# Patient Record
Sex: MALE | Race: WHITE | NOT HISPANIC OR LATINO
[De-identification: names, ages, dates, MRNs, and addresses within clinical notes are randomized per-mention and may not be internally consistent; named-entity substitution may affect disease eponyms.]

---

## 2019-07-01 ENCOUNTER — APPOINTMENT (OUTPATIENT)
Dept: INTERNAL MEDICINE | Facility: CLINIC | Age: 54
End: 2019-07-01
Payer: COMMERCIAL

## 2019-07-01 ENCOUNTER — NON-APPOINTMENT (OUTPATIENT)
Age: 54
End: 2019-07-01

## 2019-07-01 VITALS
HEIGHT: 67 IN | RESPIRATION RATE: 16 BRPM | HEART RATE: 62 BPM | DIASTOLIC BLOOD PRESSURE: 90 MMHG | TEMPERATURE: 97.9 F | WEIGHT: 248 LBS | OXYGEN SATURATION: 96 % | SYSTOLIC BLOOD PRESSURE: 140 MMHG | BODY MASS INDEX: 38.92 KG/M2

## 2019-07-01 DIAGNOSIS — Z80.0 FAMILY HISTORY OF MALIGNANT NEOPLASM OF DIGESTIVE ORGANS: ICD-10-CM

## 2019-07-01 DIAGNOSIS — I10 ESSENTIAL (PRIMARY) HYPERTENSION: ICD-10-CM

## 2019-07-01 DIAGNOSIS — Z86.39 PERSONAL HISTORY OF OTHER ENDOCRINE, NUTRITIONAL AND METABOLIC DISEASE: ICD-10-CM

## 2019-07-01 DIAGNOSIS — E66.9 OBESITY, UNSPECIFIED: ICD-10-CM

## 2019-07-01 DIAGNOSIS — Z00.00 ENCOUNTER FOR GENERAL ADULT MEDICAL EXAMINATION W/OUT ABNORMAL FINDINGS: ICD-10-CM

## 2019-07-01 DIAGNOSIS — J45.909 UNSPECIFIED ASTHMA, UNCOMPLICATED: ICD-10-CM

## 2019-07-01 DIAGNOSIS — N50.819 TESTICULAR PAIN, UNSPECIFIED: ICD-10-CM

## 2019-07-01 PROCEDURE — 93000 ELECTROCARDIOGRAM COMPLETE: CPT

## 2019-07-01 PROCEDURE — 99386 PREV VISIT NEW AGE 40-64: CPT | Mod: 25

## 2019-07-01 RX ORDER — MONTELUKAST 10 MG/1
10 TABLET, FILM COATED ORAL DAILY
Qty: 30 | Refills: 5 | Status: ACTIVE | COMMUNITY

## 2019-07-01 NOTE — HEALTH RISK ASSESSMENT
[Good] : ~his/her~  mood as  good [No] : In the past 12 months have you used drugs other than those required for medical reasons? No [No falls in past year] : Patient reported no falls in the past year [0] : 2) Feeling down, depressed, or hopeless: Not at all (0) [HIV Test offered] : HIV Test offered [None] : None [Feels Safe at Home] : Feels safe at home [Fully functional (bathing, dressing, toileting, transferring, walking, feeding)] : Fully functional (bathing, dressing, toileting, transferring, walking, feeding) [Fully functional (using the telephone, shopping, preparing meals, housekeeping, doing laundry, using] : Fully functional and needs no help or supervision to perform IADLs (using the telephone, shopping, preparing meals, housekeeping, doing laundry, using transportation, managing medications and managing finances) [Smoke Detector] : smoke detector [Carbon Monoxide Detector] : carbon monoxide detector [Seat Belt] :  uses seat belt [Sunscreen] : uses sunscreen [With Patient/Caregiver] : With Patient/Caregiver [FreeTextEntry1] : Check up\par  [] : No [de-identified] : None [[id number]] : 0 [Change in mental status noted] : No change in mental status noted [Reports changes in hearing] : Reports no changes in hearing [Reports changes in vision] : Reports no changes in vision [Reports changes in dental health] : Reports no changes in dental health [ColonoscopyComments] : As ordered for today. [AdvancecareDate] : 07/19

## 2019-07-01 NOTE — COUNSELING
[Weight management counseling provided] : Weight management [Healthy eating counseling provided] : healthy eating [Activity counseling provided] : activity [Good understanding] : Patient has a good understanding of disease, goals and obesity follow-up plan [None] : None

## 2019-07-01 NOTE — ASSESSMENT
[FreeTextEntry1] : 53 year old male found to have stable Hypertension, Asthma, Obesity,with the current regimen, diet and life style modifications, as counseled. Prior results reviewed and discussed with the patient during today's examination. Plan as ordered.\par EKG showed NSR at the rate of 69 BPM, LAD, non-sp ST-T changes noted.\par Patient is refusing all immunizations, in spite of counseling risks and benefits.\par

## 2019-07-01 NOTE — REVIEW OF SYSTEMS
[Negative] : Heme/Lymph [FreeTextEntry8] : Long standing intermittent Testicular pain, URO F/U as counseled.

## 2019-07-01 NOTE — HISTORY OF PRESENT ILLNESS
[de-identified] : 53 year old male patient with history of stable Hypertension, Asthma, Obesity, history as stated, presented for an annual preventative examination. Patient denies any associated symptoms of shortness of breath, chest pain, abdominal pain at this time.\par \par

## 2019-07-02 DIAGNOSIS — R73.09 OTHER ABNORMAL GLUCOSE: ICD-10-CM

## 2019-07-02 DIAGNOSIS — E78.00 PURE HYPERCHOLESTEROLEMIA, UNSPECIFIED: ICD-10-CM

## 2019-07-02 DIAGNOSIS — E55.9 VITAMIN D DEFICIENCY, UNSPECIFIED: ICD-10-CM

## 2019-07-02 LAB
25(OH)D3 SERPL-MCNC: 26.1 NG/ML
ALBUMIN SERPL ELPH-MCNC: 4.3 G/DL
ALP BLD-CCNC: 55 U/L
ALT SERPL-CCNC: 22 U/L
ANION GAP SERPL CALC-SCNC: 13 MMOL/L
APPEARANCE: CLEAR
AST SERPL-CCNC: 18 U/L
BASOPHILS # BLD AUTO: 0.06 K/UL
BASOPHILS NFR BLD AUTO: 1.5 %
BILIRUB SERPL-MCNC: 0.2 MG/DL
BILIRUBIN URINE: NEGATIVE
BLOOD URINE: NEGATIVE
BUN SERPL-MCNC: 14 MG/DL
CALCIUM SERPL-MCNC: 9.2 MG/DL
CHLORIDE SERPL-SCNC: 106 MMOL/L
CHOLEST SERPL-MCNC: 234 MG/DL
CHOLEST/HDLC SERPL: 5.2 RATIO
CO2 SERPL-SCNC: 24 MMOL/L
COLOR: YELLOW
CREAT SERPL-MCNC: 0.87 MG/DL
EOSINOPHIL # BLD AUTO: 0.15 K/UL
EOSINOPHIL NFR BLD AUTO: 3.8 %
ERYTHROCYTE [SEDIMENTATION RATE] IN BLOOD BY WESTERGREN METHOD: 14 MM/HR
ESTIMATED AVERAGE GLUCOSE: 120 MG/DL
FOLATE SERPL-MCNC: 13.2 NG/ML
GGT SERPL-CCNC: 30 U/L
GLUCOSE QUALITATIVE U: NEGATIVE
GLUCOSE SERPL-MCNC: 106 MG/DL
HBA1C MFR BLD HPLC: 5.8 %
HCT VFR BLD CALC: 43 %
HDLC SERPL-MCNC: 45 MG/DL
HGB BLD-MCNC: 14.2 G/DL
HIV1+2 AB SPEC QL IA.RAPID: NONREACTIVE
HSV 1+2 IGG SER IA-IMP: NEGATIVE
HSV 1+2 IGG SER IA-IMP: NEGATIVE
HSV1 IGG SER QL: 0.84 INDEX
HSV2 IGG SER QL: 0.09 INDEX
IMM GRANULOCYTES NFR BLD AUTO: 0.5 %
IRON SATN MFR SERPL: 30 %
IRON SERPL-MCNC: 96 UG/DL
KETONES URINE: NEGATIVE
LDLC SERPL CALC-MCNC: 164 MG/DL
LEUKOCYTE ESTERASE URINE: NEGATIVE
LYMPHOCYTES # BLD AUTO: 1.38 K/UL
LYMPHOCYTES NFR BLD AUTO: 34.8 %
MAN DIFF?: NORMAL
MCHC RBC-ENTMCNC: 29.3 PG
MCHC RBC-ENTMCNC: 33 GM/DL
MCV RBC AUTO: 88.8 FL
MONOCYTES # BLD AUTO: 0.41 K/UL
MONOCYTES NFR BLD AUTO: 10.4 %
NEUTROPHILS # BLD AUTO: 1.94 K/UL
NEUTROPHILS NFR BLD AUTO: 49 %
NITRITE URINE: NEGATIVE
PH URINE: 6
PLATELET # BLD AUTO: 219 K/UL
POTASSIUM SERPL-SCNC: 4.5 MMOL/L
PROT SERPL-MCNC: 6.7 G/DL
PROTEIN URINE: NORMAL
PSA FREE FLD-MCNC: 28 %
PSA FREE SERPL-MCNC: 0.25 NG/ML
PSA SERPL-MCNC: 0.9 NG/ML
RBC # BLD: 4.84 M/UL
RBC # FLD: 13.9 %
SODIUM SERPL-SCNC: 143 MMOL/L
SPECIFIC GRAVITY URINE: 1.03
T PALLIDUM AB SER QL IA: NEGATIVE
T3 SERPL-MCNC: 88 NG/DL
T4 FREE SERPL-MCNC: 0.9 NG/DL
TIBC SERPL-MCNC: 319 UG/DL
TRIGL SERPL-MCNC: 126 MG/DL
TSH SERPL-ACNC: 0.67 UIU/ML
UIBC SERPL-MCNC: 223 UG/DL
UROBILINOGEN URINE: NORMAL
VIT B12 SERPL-MCNC: 496 PG/ML
WBC # FLD AUTO: 3.96 K/UL

## 2019-07-03 LAB
HSV1 IGM SER QL: NORMAL TITER
HSV2 AB FLD-ACNC: NORMAL TITER

## 2023-04-28 ENCOUNTER — HOSPITAL ENCOUNTER (EMERGENCY)
Facility: HOSPITAL | Age: 58
Discharge: HOME | End: 2023-04-28
Attending: EMERGENCY MEDICINE | Admitting: EMERGENCY MEDICINE

## 2023-04-28 ENCOUNTER — APPOINTMENT (EMERGENCY)
Dept: CT IMAGING | Facility: HOSPITAL | Age: 58
End: 2023-04-28

## 2023-04-28 VITALS
SYSTOLIC BLOOD PRESSURE: 126 MMHG | TEMPERATURE: 98 F | HEART RATE: 58 BPM | RESPIRATION RATE: 20 BRPM | DIASTOLIC BLOOD PRESSURE: 83 MMHG | OXYGEN SATURATION: 96 %

## 2023-04-28 DIAGNOSIS — R10.11 RIGHT UPPER QUADRANT ABDOMINAL PAIN: Primary | ICD-10-CM

## 2023-04-28 DIAGNOSIS — K76.9 LIVER LESION: ICD-10-CM

## 2023-04-28 DIAGNOSIS — R14.0 ABDOMINAL BLOATING: ICD-10-CM

## 2023-04-28 DIAGNOSIS — E27.8 ADRENAL NODULE (HCC): ICD-10-CM

## 2023-04-28 DIAGNOSIS — R59.9 LYMPH NODE ENLARGEMENT: ICD-10-CM

## 2023-04-28 LAB
ALBUMIN SERPL BCP-MCNC: 4.7 G/DL (ref 3.5–5)
ALP SERPL-CCNC: 60 U/L (ref 34–104)
ALT SERPL W P-5'-P-CCNC: 20 U/L (ref 7–52)
ANION GAP SERPL CALCULATED.3IONS-SCNC: 5 MMOL/L (ref 4–13)
AST SERPL W P-5'-P-CCNC: 19 U/L (ref 13–39)
BASOPHILS # BLD AUTO: 0.05 THOUSANDS/ΜL (ref 0–0.1)
BASOPHILS NFR BLD AUTO: 1 % (ref 0–1)
BILIRUB SERPL-MCNC: 0.36 MG/DL (ref 0.2–1)
BUN SERPL-MCNC: 13 MG/DL (ref 5–25)
CALCIUM SERPL-MCNC: 9.8 MG/DL (ref 8.4–10.2)
CHLORIDE SERPL-SCNC: 103 MMOL/L (ref 96–108)
CO2 SERPL-SCNC: 30 MMOL/L (ref 21–32)
CREAT SERPL-MCNC: 0.81 MG/DL (ref 0.6–1.3)
EOSINOPHIL # BLD AUTO: 0.21 THOUSAND/ΜL (ref 0–0.61)
EOSINOPHIL NFR BLD AUTO: 4 % (ref 0–6)
ERYTHROCYTE [DISTWIDTH] IN BLOOD BY AUTOMATED COUNT: 12.8 % (ref 11.6–15.1)
GFR SERPL CREATININE-BSD FRML MDRD: 100 ML/MIN/1.73SQ M
GLUCOSE SERPL-MCNC: 98 MG/DL (ref 65–140)
HCT VFR BLD AUTO: 47.3 % (ref 36.5–49.3)
HGB BLD-MCNC: 15.7 G/DL (ref 12–17)
IMM GRANULOCYTES # BLD AUTO: 0.02 THOUSAND/UL (ref 0–0.2)
IMM GRANULOCYTES NFR BLD AUTO: 0 % (ref 0–2)
LIPASE SERPL-CCNC: 28 U/L (ref 11–82)
LYMPHOCYTES # BLD AUTO: 1.91 THOUSANDS/ΜL (ref 0.6–4.47)
LYMPHOCYTES NFR BLD AUTO: 37 % (ref 14–44)
MCH RBC QN AUTO: 29.2 PG (ref 26.8–34.3)
MCHC RBC AUTO-ENTMCNC: 33.2 G/DL (ref 31.4–37.4)
MCV RBC AUTO: 88 FL (ref 82–98)
MONOCYTES # BLD AUTO: 0.5 THOUSAND/ΜL (ref 0.17–1.22)
MONOCYTES NFR BLD AUTO: 10 % (ref 4–12)
NEUTROPHILS # BLD AUTO: 2.49 THOUSANDS/ΜL (ref 1.85–7.62)
NEUTS SEG NFR BLD AUTO: 48 % (ref 43–75)
NRBC BLD AUTO-RTO: 0 /100 WBCS
PLATELET # BLD AUTO: 219 THOUSANDS/UL (ref 149–390)
PMV BLD AUTO: 10.8 FL (ref 8.9–12.7)
POTASSIUM SERPL-SCNC: 4.4 MMOL/L (ref 3.5–5.3)
PROT SERPL-MCNC: 7.7 G/DL (ref 6.4–8.4)
RBC # BLD AUTO: 5.38 MILLION/UL (ref 3.88–5.62)
SODIUM SERPL-SCNC: 138 MMOL/L (ref 135–147)
WBC # BLD AUTO: 5.18 THOUSAND/UL (ref 4.31–10.16)

## 2023-04-28 RX ADMIN — IOHEXOL 100 ML: 350 INJECTION, SOLUTION INTRAVENOUS at 14:07

## 2023-04-28 NOTE — DISCHARGE INSTRUCTIONS
Please follow up PCP and GI  Recommend tylenol 650 mg and ibuprofen 600 mg every 6 hours as needed for pain  Please return for severe chest pain, significant shortness of breath, severely worsening symptoms, or any other concerning signs or symptoms  Please refer to the following documents for additional instructions and return precautions  1   No acute abnormality in the abdomen or pelvis  2   Multiple indeterminate hypodense lesions in the liver, possibly hemangiomata  Follow-up nonemergent MRI is recommended for more definitive characterization  3   Small indeterminate left adrenal nodule  This is likely an adenoma though this can also be confirmed on MRI  4   Borderline enlarged right internal iliac lymph nodes, asymmetric compared with the left  This is of uncertain etiology and clinical significance  Isolated lymphadenopathy in this region can be seen in the setting of a right-sided prostate cancer and   correlation with PSA is recommended  Otherwise a 3-6 month follow-up CT is recommended to ensure stability

## 2023-04-28 NOTE — ED PROVIDER NOTES
History  Chief Complaint   Patient presents with   • Abdominal Pain     Pt states he lifted something heavy and now feels discomfort on the upper right side of his abdomen      68-year-old male history of asthma presenting with request abdominal pain  Patient reports issues with right upper quadrant discomfort for the past few years  Symptoms worse beginning a few days ago after patient carrying heavy objects  Reports pain just under right costal margin and occasionally feels a protrusion  Patient concerned about possible hernia  Reports previous laparoscopic appendectomy  Denies any nausea vomiting diarrhea  Does report some mild bloating  Denies any chest pain shortness of breath  Denies any other complaints  Chart reviewed  Past Medical History:  No date: Asthma  Family History: non-contributory  Social History            None       Past Medical History:   Diagnosis Date   • Asthma        History reviewed  No pertinent surgical history  History reviewed  No pertinent family history  I have reviewed and agree with the history as documented  E-Cigarette/Vaping     E-Cigarette/Vaping Substances     Social History     Tobacco Use   • Smoking status: Unknown       Review of Systems   Constitutional: Negative for appetite change, chills, diaphoresis, fever and unexpected weight change  HENT: Negative for congestion and rhinorrhea  Eyes: Negative for photophobia and visual disturbance  Respiratory: Negative for cough, chest tightness and shortness of breath  Cardiovascular: Negative for chest pain, palpitations and leg swelling  Gastrointestinal: Positive for abdominal pain  Negative for abdominal distention, blood in stool, constipation, diarrhea, nausea and vomiting  Genitourinary: Negative for dysuria and hematuria  Musculoskeletal: Negative for back pain, joint swelling, neck pain and neck stiffness  Skin: Negative for color change, pallor, rash and wound     Neurological: Negative for dizziness, syncope, weakness, light-headedness and headaches  Psychiatric/Behavioral: Negative for agitation  All other systems reviewed and are negative  Physical Exam  Physical Exam  Vitals and nursing note reviewed  Constitutional:       General: He is not in acute distress  Appearance: Normal appearance  He is well-developed  He is not ill-appearing, toxic-appearing or diaphoretic  HENT:      Head: Normocephalic and atraumatic  Nose: Nose normal  No congestion or rhinorrhea  Mouth/Throat:      Mouth: Mucous membranes are moist       Pharynx: Oropharynx is clear  No oropharyngeal exudate or posterior oropharyngeal erythema  Eyes:      General: No scleral icterus  Right eye: No discharge  Left eye: No discharge  Extraocular Movements: Extraocular movements intact  Conjunctiva/sclera: Conjunctivae normal       Pupils: Pupils are equal, round, and reactive to light  Neck:      Vascular: No JVD  Trachea: No tracheal deviation  Comments: Supple  Normal range of motion  Cardiovascular:      Rate and Rhythm: Normal rate and regular rhythm  Heart sounds: Normal heart sounds  No murmur heard  No friction rub  No gallop  Comments: Normal rate and regular rhythm  Pulmonary:      Effort: Pulmonary effort is normal  No respiratory distress  Breath sounds: Normal breath sounds  No stridor  No wheezing or rales  Comments: Clear to auscultation bilaterally  Chest:      Chest wall: No tenderness  Abdominal:      General: Bowel sounds are normal  There is distension  Palpations: Abdomen is soft  Tenderness: There is abdominal tenderness in the right upper quadrant  There is no right CVA tenderness, left CVA tenderness, guarding or rebound  Comments: Mild upper quadrant abdominal tenderness just below costal margin without guarding  Otherwise soft and mildly distended    Normal bowel sounds throughout Musculoskeletal:         General: No swelling, tenderness, deformity or signs of injury  Normal range of motion  Cervical back: Normal range of motion and neck supple  No rigidity  No muscular tenderness  Right lower leg: No edema  Left lower leg: No edema  Lymphadenopathy:      Cervical: No cervical adenopathy  Skin:     General: Skin is warm and dry  Coloration: Skin is not pale  Findings: No erythema or rash  Neurological:      General: No focal deficit present  Mental Status: He is alert  Mental status is at baseline  Sensory: No sensory deficit  Motor: No weakness or abnormal muscle tone  Coordination: Coordination normal       Gait: Gait normal       Comments: Alert  Strength and sensation grossly intact  Ambulatory without difficulty at baseline  Psychiatric:         Behavior: Behavior normal          Thought Content:  Thought content normal          Vital Signs  ED Triage Vitals   Temperature Pulse Respirations Blood Pressure SpO2   04/28/23 1243 04/28/23 1244 04/28/23 1243 04/28/23 1244 04/28/23 1244   98 °F (36 7 °C) 75 18 (!) 188/94 96 %      Temp Source Heart Rate Source Patient Position - Orthostatic VS BP Location FiO2 (%)   04/28/23 1243 04/28/23 1243 04/28/23 1243 04/28/23 1243 --   Temporal Monitor Sitting Left arm       Pain Score       --                  Vitals:    04/28/23 1243 04/28/23 1244 04/28/23 1539   BP:  (!) 188/94 126/83   Pulse:  75 58   Patient Position - Orthostatic VS: Sitting Sitting Sitting         Visual Acuity      ED Medications  Medications   iohexol (OMNIPAQUE) 350 MG/ML injection (MULTI-DOSE) 100 mL (100 mL Intravenous Given 4/28/23 1407)       Diagnostic Studies  Results Reviewed     Procedure Component Value Units Date/Time    Comprehensive metabolic panel [[id number]] Collected: 04/28/23 1312    Lab Status: Final result Specimen: Blood from Arm, Right Updated: 04/28/23 1334     Sodium 138 mmol/L      Potassium 4 4 mmol/L      Chloride 103 mmol/L      CO2 30 mmol/L      ANION GAP 5 mmol/L      BUN 13 mg/dL      Creatinine 0 81 mg/dL      Glucose 98 mg/dL      Calcium 9 8 mg/dL      AST 19 U/L      ALT 20 U/L      Alkaline Phosphatase 60 U/L      Total Protein 7 7 g/dL      Albumin 4 7 g/dL      Total Bilirubin 0 36 mg/dL      eGFR 100 ml/min/1 73sq m     Narrative:      Specimen Lipemic  [place] guidelines for Chronic Kidney Disease (CKD):   •  Stage 1 with normal or high GFR (GFR > 90 mL/min/1 73 square meters)  •  Stage 2 Mild CKD (GFR = 60-89 mL/min/1 73 square meters)  •  Stage 3A Moderate CKD (GFR = 45-59 mL/min/1 73 square meters)  •  Stage 3B Moderate CKD (GFR = 30-44 mL/min/1 73 square meters)  •  Stage 4 Severe CKD (GFR = 15-29 mL/min/1 73 square meters)  •  Stage 5 End Stage CKD (GFR <15 mL/min/1 73 square meters)  Note: GFR calculation is accurate only with a steady state creatinine    Lipase [[id number]]  (Normal) Collected: 04/28/23 1312    Lab Status: Final result Specimen: Blood from Arm, Right Updated: 04/28/23 1334     Lipase 28 u/L     Narrative:      Specimen Lipemic      CBC and differential [[id number]] Collected: 04/28/23 1312    Lab Status: Final result Specimen: Blood from Arm, Right Updated: 04/28/23 1316     WBC 5 18 Thousand/uL      RBC 5 38 Million/uL      Hemoglobin 15 7 g/dL      Hematocrit 47 3 %      MCV 88 fL      MCH 29 2 pg      MCHC 33 2 g/dL      RDW 12 8 %      MPV 10 8 fL      Platelets 701 Thousands/uL      nRBC 0 /100 WBCs      Neutrophils Relative 48 %      Immat GRANS % 0 %      Lymphocytes Relative 37 %      Monocytes Relative 10 %      Eosinophils Relative 4 %      Basophils Relative 1 %      Neutrophils Absolute 2 49 Thousands/µL      Immature Grans Absolute 0 02 Thousand/uL      Lymphocytes Absolute 1 91 Thousands/µL      Monocytes Absolute 0 50 Thousand/µL      Eosinophils Absolute 0 21 Thousand/µL      Basophils Absolute 0 05 Thousands/µL CT abdomen pelvis with contrast   Final Result by Murtaza Romano MD (04/28 0591)      1  No acute abnormality in the abdomen or pelvis  2   Multiple indeterminate hypodense lesions in the liver, possibly hemangiomata  Follow-up nonemergent MRI is recommended for more definitive characterization  3   Small indeterminate left adrenal nodule  This is likely an adenoma though this can also be confirmed on MRI  4   Borderline enlarged right internal iliac lymph nodes, asymmetric compared with the left  This is of uncertain etiology and clinical significance  Isolated lymphadenopathy in this region can be seen in the setting of a right-sided prostate cancer and    correlation with PSA is recommended  Otherwise a 3-6 month follow-up CT is recommended to ensure stability  The study was marked in [facility] for immediate notification  Workstation performed: [id number]                    Procedures  Procedures         ED Course                               SBIRT 20yo+    Flowsheet Row Most Recent Value   Initial Alcohol Screen: US AUDIT-C     1  How often do you have a drink containing alcohol? 0 Filed at: 04/28/2023 1245   2  How many drinks containing alcohol do you have on a typical day you are drinking? 0 Filed at: 04/28/2023 1245   3a  Male UNDER 65: How often do you have five or more drinks on one occasion? 0 Filed at: 04/28/2023 1245   Audit-C Score 0 Filed at: 04/28/2023 1245   PHIL: How many times in the past year have you    Used an illegal drug or used a prescription medication for non-medical reasons? Never Filed at: 04/28/2023 1245                    Medical Decision Making  61-year-old male history of asthma presenting with request abdominal pain  No protuberance on exam   Worsening pain after heavy lifting  Possible hernia  Plan for CT imaging and basic labs including abdominal labs  Reassess  Labs interpreted by me and no significant acute process    Imaging notable for multiple incidental findings including adrenal nodule, liver lesions, and lymphadenopathy with concern for prostate with recommendation of follow-up PSA testing  Discussed results and incidental findings with patient and need for follow-up testing and imaging  Discussed results and recommendations  Advised follow up PCP  Medication recommendations  Given instructions and return precautions  Patient/family at bedside acknowledged understanding of all written and verbal instructions and return precautions  Discharged  Amount and/or Complexity of Data Reviewed  Labs: ordered  Radiology: ordered  Risk  Prescription drug management  Disposition  Final diagnoses:   Right upper quadrant abdominal pain   Abdominal bloating   Adrenal nodule (HCC)   Liver lesion   Lymph node enlargement     Time reflects when diagnosis was documented in both MDM as applicable and the Disposition within this note     Time User Action Codes Description Comment    4/28/2023  1:08 PM Daryn Piggs Add [R10 11] Right upper quadrant abdominal pain     4/28/2023  1:08 PM Daryn Piggs Add [R14 0] Abdominal bloating     4/28/2023  3:43 PM Daryn Piggs Add [E27 8] Adrenal nodule ([address] )     4/28/2023  3:44 PM Daryn Piggs Add [K76 9] Liver lesion     4/28/2023  5:59 PM Daryn Piggs Add [R59 9] Lymph node enlargement       ED Disposition     ED Disposition   Discharge    Condition   Stable    Date/Time   Fri Apr 28, 2023  3:43 PM    Comment   Alfonzo Malone discharge to home/self care                 Follow-up Information     Follow up With Specialties Details Why Contact Info Additional [id number] Beverley Landeros MD Pulmonary Disease, Internal Medicine Schedule an appointment as soon as possible for a visit in 1 week  [address] [place] [address]  [address] [facility] Schedule an appointment as soon as possible for a visit in 1 week  [address] [facility] [id number]  Calvin Brizuela [address] [facility] [place], [address] [facility], [address], [place], [address]           There are no discharge medications for this patient  No discharge procedures on file      PDMP Review     None          ED Provider  Electronically Signed by           Tesfaye Siegel MD  04/28/23 8506

## 2023-06-28 ENCOUNTER — APPOINTMENT (OUTPATIENT)
Dept: HEPATOLOGY | Facility: CLINIC | Age: 58
End: 2023-06-28

## 2025-03-10 ENCOUNTER — EVALUATION (OUTPATIENT)
Dept: PHYSICAL THERAPY | Facility: CLINIC | Age: 60
End: 2025-03-10
Payer: COMMERCIAL

## 2025-03-10 DIAGNOSIS — M54.2 CERVICALGIA: Primary | ICD-10-CM

## 2025-03-10 PROCEDURE — 97161 PT EVAL LOW COMPLEX 20 MIN: CPT | Performed by: PHYSICAL THERAPIST

## 2025-03-10 PROCEDURE — 97140 MANUAL THERAPY 1/> REGIONS: CPT | Performed by: PHYSICAL THERAPIST

## 2025-03-10 PROCEDURE — 97110 THERAPEUTIC EXERCISES: CPT | Performed by: PHYSICAL THERAPIST

## 2025-03-10 RX ORDER — PREDNISONE 10 MG/1
10 TABLET ORAL DAILY
COMMUNITY

## 2025-03-10 RX ORDER — ALBUTEROL SULFATE 0.83 MG/ML
2.5 SOLUTION RESPIRATORY (INHALATION) EVERY 6 HOURS PRN
COMMUNITY

## 2025-03-10 RX ORDER — EPINEPHRINE 0.3 MG/.3ML
0.3 INJECTION SUBCUTANEOUS ONCE
COMMUNITY

## 2025-03-10 RX ORDER — CETIRIZINE HYDROCHLORIDE 10 MG/1
CAPSULE, LIQUID FILLED ORAL
COMMUNITY

## 2025-03-10 RX ORDER — IBUPROFEN 800 MG/1
TABLET, FILM COATED ORAL EVERY 6 HOURS PRN
COMMUNITY

## 2025-03-10 RX ORDER — MELOXICAM 15 MG/1
15 TABLET ORAL DAILY
COMMUNITY

## 2025-03-10 RX ORDER — BENZONATATE 200 MG/1
200 CAPSULE ORAL 3 TIMES DAILY PRN
COMMUNITY

## 2025-03-10 RX ORDER — MONTELUKAST SODIUM 10 MG/1
10 TABLET ORAL
COMMUNITY

## 2025-03-10 RX ORDER — CYCLOBENZAPRINE HCL 10 MG
10 TABLET ORAL 3 TIMES DAILY PRN
COMMUNITY

## 2025-03-10 RX ORDER — ALBUTEROL SULFATE 90 UG/1
2 INHALANT RESPIRATORY (INHALATION) EVERY 6 HOURS PRN
COMMUNITY

## 2025-03-10 RX ORDER — PANTOPRAZOLE SODIUM 20 MG/1
20 TABLET, DELAYED RELEASE ORAL DAILY
COMMUNITY

## 2025-03-10 RX ORDER — BUDESONIDE AND FORMOTEROL FUMARATE DIHYDRATE 160; 4.5 UG/1; UG/1
2 AEROSOL RESPIRATORY (INHALATION) 2 TIMES DAILY
COMMUNITY

## 2025-03-10 RX ORDER — DEXTROMETHORPHAN HYDROBROMIDE AND PROMETHAZINE HYDROCHLORIDE 15; 6.25 MG/5ML; MG/5ML
SYRUP ORAL 4 TIMES DAILY PRN
COMMUNITY

## 2025-03-10 NOTE — PROGRESS NOTES
PT Evaluation     Today's date: 3/10/2025  Patient name: Alfonzo Malone  : 1965  MRN: [id number]  Referring provider: Chio Rincon MD  Dx:   Encounter Diagnosis     ICD-10-CM    1. Cervicalgia  M54.2                      Assessment  Impairments: abnormal muscle tone, abnormal or restricted ROM, activity intolerance, lacks appropriate home exercise program and pain with function    Assessment details: Alfonzo Malone is a 59 y.o. male presenting as an outpatient to [facility] PT w/ c/o c/s pain (L worse vs R).  In addition to pain, pt presents w/ impaired posture, decreased ROM, and hypertonicity of surrounding musculature significantly limiting pt's function.  Pt will benefit from skilled PT services to address the above deficits in order to max function to allow pt to achieve goals in PT.  Thank you for the referral of this pt.     Barriers to therapy: High co-pay may limit frequency of tx  Understanding of Dx/Px/POC: good     Prognosis: good    Goals  ST.Pain decreased by 25% in 4 weeks.  2. ROM increased by 25% in 4 weeks.  3. Pt will be independent w/ initial HEP in 1-2 visits.     LT. Decrease pain to 1-2/10 at worst by d/c.  2. Increase ROM to WFL for all deficient movements by d/c.  3. Strength increased to 5 for all deficient muscle groups by d/c.  4. IADL performance increased to max function by d/c.  5. Recreational performance increased to max function by d/c.      Plan  Planned modality interventions: cryotherapy, TENS and thermotherapy: hydrocollator packs  Other planned modality interventions: other modalities PRN    Planned therapy interventions: ADL retraining, IADL retraining, flexibility, functional ROM exercises, graded exercise, home exercise program, manual therapy, neuromuscular re-education, patient education, postural training, strengthening, therapeutic exercise and therapeutic activities  Other planned therapy interventions: other interventions  PRN    Frequency: 1-2x/week.  Duration in weeks: 8  Plan of Care beginning date: 3/10/2025  Plan of Care expiration date: 2025  Treatment plan discussed with: patient      Subjective Evaluation    History of Present Illness  Mechanism of injury: Pt reports to IE w/ c/o neck pain (L worse vs R)  which began > 6 mos ago of insidious onset w/ progressive worsening.  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     Pt reports also w/ a hx of t/s pain which began approx 2 years ago and is intermittent.    Pt denies any numbness/parasthesias into b/l UE. Pt denies any radicular pain.     Pt denies any bowel/bladder changes. Pt denies any balance issues.     Pt reports using a neck brace intermittently when pain is more severe.   Patient Goals  Patient goals for therapy: decreased pain and increased motion    Pain  Current pain ratin  At worst pain ratin  Location: c/s  Relieving factors: medications (neck brace, rest, special pillow to support l/s, sitting w/ c/s flexed for a period of time)  Exacerbated by: L c/s rotation, sitting w/ erect posture.    Social Support  Lives with: alone    Employment status: working (Pt works full time in construction)  Hand dominance: right      Diagnostic Tests  Abnormal MRI: Pt reports just having c/s MRI this AM- pending results; he had t/s MRI approx 2 years ago demonstrating several disc bulges.        Objective     Active Range of Motion   Cervical/Thoracic Spine       Cervical    Flexion: 58 degrees  with pain  Extension: 57 degrees      Left lateral flexion: 30 degrees     with pain  Right lateral flexion: 37 degrees     with pain  Left rotation: 54 degrees with pain  Right rotation: 47 degrees    with pain    Thoracic    Flexion:  with pain Restriction level: moderate  Extension:  with pain Restriction level: moderate  Left  rotation:  Restriction level: minimal  Right rotation:  Restriction level: minimal    General Comments:      Cervical/Thoracic Comments  Observation/Posture:  Pt sits w/ fwd, rounded shoulders and fwd head    Dermatomes:   C2: intact, symmetrical  C3: intact, symmetrical  C4: intact, symmetrical  C5: intact, symmetrical  C6: intact, symmetrical  C7: intact symmetrical  C8: intact, symmetrical    Palpation: Hypertonicity/Tenderness w/ palpation to b/l c/s psp and LUT    c/s special tests:   sharp-cy: negative  VBI: negative  spurlings: negative            Daily Treatment Diary       POC expires Unit limit Auth Expiration date PT/OT/ST + Visit Limit?   5/5/25 BOMN PENDING BOMN                           Visit/Unit Tracking  AUTH Status:  Date               PENDING Used                Remaining                    Diagnosis: c/s pain   Precautions: asthma - has inhaler; extremely allergic to cats   POC Expires: 5/5/25   Re-evaluation Date: 4/7/25   Visit Count 1/10       Manuals 3/10       TPR to c/s psp, b/l UT/periscap RB        C/s PROM        C/s traction RB       Consider p/a mobs to c/s                        Ther Ex        Pt Ed RB- HEP instruct/handout       C/s AROM- supine rotation        UT stretch        T/s extension        T/s rotation                                                        Neuro Re-Ed        Scapular retraction        Post sh rolls        Chin tucks        LPD/rows        TB resisted UT strengthening        TB resisted ER        TB resisted horizontal abduction        Prone scap retraction                                               Ther Act                                                                                                         Modalities             CP PRN

## 2025-03-10 NOTE — LETTER
2025    Chio Rincon MD  [address] [place] [id number]    Patient: Alfonzo Malone   YOB: 1965   Date of Visit: 3/10/2025     Encounter Diagnosis     ICD-10-CM    1. Cervicalgia  M54.2           Dear Dr. Rincon:    Thank you for your recent referral of Alfonzo Malone. Please review the attached evaluation summary from Alfonzo's recent visit.     Please verify that you agree with the plan of care by signing the attached order.     If you have any questions or concerns, please do not hesitate to call.     I sincerely appreciate the opportunity to share in the care of one of your patients and hope to have another opportunity to work with you in the near future.       Sincerely,    Juan Manuel Harris, PT      Referring Provider:      I certify that I have read the below Plan of Care and certify the need for these services furnished under this plan of treatment while under my care.                    Chio Rincon MD  [address] [place] [id number]  Via Fax: [phone]          PT Evaluation     Today's date: 3/10/2025  Patient name: Alfonzo Malone  : 1965  MRN: [id number]  Referring provider: Chio Rincon MD  Dx:   Encounter Diagnosis     ICD-10-CM    1. Cervicalgia  M54.2                      Assessment  Impairments: abnormal muscle tone, abnormal or restricted ROM, activity intolerance, lacks appropriate home exercise program and pain with function    Assessment details: Alfonzo Malone is a 59 y.o. male presenting as an outpatient to [facility] PT w/ c/o c/s pain (L worse vs R).  In addition to pain, pt presents w/ impaired posture, decreased ROM, and hypertonicity of surrounding musculature significantly limiting pt's function.  Pt will benefit from skilled PT services to address the above deficits in order to max function to allow pt to achieve goals in PT.  Thank you for the referral of this pt.     Barriers  to therapy: High co-pay may limit frequency of tx  Understanding of Dx/Px/POC: good     Prognosis: good    Goals  ST.Pain decreased by 25% in 4 weeks.  2. ROM increased by 25% in 4 weeks.  3. Pt will be independent w/ initial HEP in 1-2 visits.     LT. Decrease pain to 1-2/10 at worst by d/c.  2. Increase ROM to WFL for all deficient movements by d/c.  3. Strength increased to 5 for all deficient muscle groups by d/c.  4. IADL performance increased to max function by d/c.  5. Recreational performance increased to max function by d/c.      Plan  Planned modality interventions: cryotherapy, TENS and thermotherapy: hydrocollator packs  Other planned modality interventions: other modalities PRN    Planned therapy interventions: ADL retraining, IADL retraining, flexibility, functional ROM exercises, graded exercise, home exercise program, manual therapy, neuromuscular re-education, patient education, postural training, strengthening, therapeutic exercise and therapeutic activities  Other planned therapy interventions: other interventions PRN    Frequency: 1-2x/week.  Duration in weeks: 8  Plan of Care beginning date: 3/10/2025  Plan of Care expiration date: 2025  Treatment plan discussed with: patient      Subjective Evaluation    History of Present Illness  Mechanism of injury: Pt reports to IE w/ c/o neck pain (L worse vs R)  which began > 6 mos ago of insidious onset w/ progressive worsening.  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     Pt reports also w/ a hx of t/s pain which began approx 2 years ago and is intermittent.    Pt denies any numbness/parasthesias into b/l UE. Pt denies any radicular pain.     Pt denies any bowel/bladder changes. Pt denies any balance issues.     Pt reports using a neck brace intermittently when pain is more severe.   Patient  Goals  Patient goals for therapy: decreased pain and increased motion    Pain  Current pain ratin  At worst pain ratin  Location: c/s  Relieving factors: medications (neck brace, rest, special pillow to support l/s, sitting w/ c/s flexed for a period of time)  Exacerbated by: L c/s rotation, sitting w/ erect posture.    Social Support  Lives with: alone    Employment status: working (Pt works full time in construction)  Hand dominance: right      Diagnostic Tests  Abnormal MRI: Pt reports just having c/s MRI this AM- pending results; he had t/s MRI approx 2 years ago demonstrating several disc bulges.        Objective     Active Range of Motion   Cervical/Thoracic Spine       Cervical    Flexion: 58 degrees  with pain  Extension: 57 degrees      Left lateral flexion: 30 degrees     with pain  Right lateral flexion: 37 degrees     with pain  Left rotation: 54 degrees with pain  Right rotation: 47 degrees    with pain    Thoracic    Flexion:  with pain Restriction level: moderate  Extension:  with pain Restriction level: moderate  Left rotation:  Restriction level: minimal  Right rotation:  Restriction level: minimal    General Comments:      Cervical/Thoracic Comments  Observation/Posture:  Pt sits w/ fwd, rounded shoulders and fwd head    Dermatomes:   C2: intact, symmetrical  C3: intact, symmetrical  C4: intact, symmetrical  C5: intact, symmetrical  C6: intact, symmetrical  C7: intact symmetrical  C8: intact, symmetrical    Palpation: Hypertonicity/Tenderness w/ palpation to b/l c/s psp and LUT    c/s special tests:   sharp-cy: negative  VBI: negative  spurlings: negative            Daily Treatment Diary       POC expires Unit limit Auth Expiration date PT/OT/ST + Visit Limit?   25 BOMN PENDING BOMN                           Visit/Unit Tracking  AUTH Status:  Date               PENDING Used                Remaining                    Diagnosis: c/s pain   Precautions: asthma - has inhaler; extremely  allergic to cats   POC Expires: 5/5/25   Re-evaluation Date: 4/7/25   Visit Count 1/10       Manuals 3/10       TPR to c/s psp, b/l UT/periscap RB        C/s PROM        C/s traction RB       Consider p/a mobs to c/s                        Ther Ex        Pt Ed RB- HEP instruct/handout       C/s AROM- supine rotation        UT stretch        T/s extension        T/s rotation                                                        Neuro Re-Ed        Scapular retraction        Post sh rolls        Chin tucks        LPD/rows        TB resisted UT strengthening        TB resisted ER        TB resisted horizontal abduction        Prone scap retraction                                               Ther Act                                                                                                         Modalities             CP PRN

## 2025-03-13 ENCOUNTER — OFFICE VISIT (OUTPATIENT)
Dept: PHYSICAL THERAPY | Facility: CLINIC | Age: 60
End: 2025-03-13
Payer: COMMERCIAL

## 2025-03-13 DIAGNOSIS — M54.2 CERVICALGIA: Primary | ICD-10-CM

## 2025-03-13 PROCEDURE — 97110 THERAPEUTIC EXERCISES: CPT

## 2025-03-13 PROCEDURE — 97140 MANUAL THERAPY 1/> REGIONS: CPT

## 2025-03-13 PROCEDURE — 97112 NEUROMUSCULAR REEDUCATION: CPT

## 2025-03-13 NOTE — PROGRESS NOTES
"Daily Note     Today's date: 3/13/2025  Patient name: Alfonzo Malone  : 1965  MRN: [id number]  Referring provider: Chio Rincon MD  Dx:   Encounter Diagnosis     ICD-10-CM    1. Cervicalgia  M54.2                      Subjective: pt reports pain and stiffness in the morning time and t/o the day.       Objective: See treatment diary below      Assessment: Tolerated treatment well. Patient would benefit from continued PT. Pt tolerated manual therapy w/ decreased stiffness and improved mobility. Pt had greater mobility post self-snag. Pt was educated about posture and use of lumbar roll to help maintain posture. Pt was educated about limiting his use of the neck brace to limit the chance of worsening his mobility and strength. Pt was educated to use neck brace if he absolutely has to for pain and having a hard time keeping his head up. Discussed how using the neck brace will not help much with the posture. Updated and reviewed HEP. Pt did have increased pain w/ UT stretch. Discussed proper desk ergonomics and to try to adjust if able.       Plan: Continue per plan of care.      Daily Treatment Diary       POC expires Unit limit Auth Expiration date PT/OT/ST + Visit Limit?   25 BOMN 25 BOMN                           Visit/Unit Tracking  AUTH Status:  Date 3/13              10 visits Used 2               Remaining  8                  Diagnosis: c/s pain   Precautions: asthma - has inhaler; extremely allergic to cats   POC Expires: 25   Re-evaluation Date: 25   Visit Count 1/10 2/10      Manuals 3/10 3/13      TPR to c/s psp, b/l UT/periscap [facility]      C/s PROM        C/s traction [facility]      Consider p/a mobs to c/s                        Ther Ex        Pt Ed RB- HEP instruct/handout       C/s AROM- supine rotation  C/s AROM sitting x10      UT stretch  P!      T/s extension        T/s rotation        Self-snag  10x5\"                                              Neuro Re-Ed      " "  Scapular retraction  5\" x10      Post sh rolls  20x      Chin tucks  Seated x10x5\"      LPD/rows        TB resisted UT strengthening        TB resisted ER        TB resisted horizontal abduction        Prone scap retraction                                               Ther Act                                                                                                         Modalities             CP PRN                              "

## 2025-03-17 ENCOUNTER — APPOINTMENT (OUTPATIENT)
Dept: PHYSICAL THERAPY | Facility: CLINIC | Age: 60
End: 2025-03-17
Payer: COMMERCIAL

## 2025-03-19 ENCOUNTER — APPOINTMENT (OUTPATIENT)
Dept: PHYSICAL THERAPY | Facility: CLINIC | Age: 60
End: 2025-03-19
Payer: COMMERCIAL

## 2025-03-24 ENCOUNTER — OFFICE VISIT (OUTPATIENT)
Dept: PHYSICAL THERAPY | Facility: CLINIC | Age: 60
End: 2025-03-24
Payer: COMMERCIAL

## 2025-03-24 DIAGNOSIS — M54.2 CERVICALGIA: Primary | ICD-10-CM

## 2025-03-24 PROCEDURE — 97140 MANUAL THERAPY 1/> REGIONS: CPT | Performed by: PHYSICAL THERAPIST

## 2025-03-24 PROCEDURE — 97110 THERAPEUTIC EXERCISES: CPT | Performed by: PHYSICAL THERAPIST

## 2025-03-24 NOTE — PROGRESS NOTES
"Daily Note     Today's date: 3/24/2025  Patient name: Alfonzo Malone  : 1965  MRN: [id number]  Referring provider: Chio Rincon MD  Dx:   Encounter Diagnosis     ICD-10-CM    1. Cervicalgia  M54.2                        Subjective: Pt reports that he was away helping mother last week and did not have much time to focus on doing neck ex.       Objective: See treatment diary below. Pt 13' late. Accommodated w/ shortened tx session.       Assessment: Tx session tolerated fairly well as below. Improved tissue tone post manuals.        Plan: Continue per plan of care.      Daily Treatment Diary       POC expires Unit limit Auth Expiration date PT/OT/ST + Visit Limit?   25 BOMN 25 BOMN                           Visit/Unit Tracking  AUTH Status:  Date 3/13 3/24             10 visits Used 2 3              Remaining  8 7                 Diagnosis: c/s pain   Precautions: asthma - has inhaler; extremely allergic to cats   POC Expires: 25   Re-evaluation Date: 25   Visit Count 10 2/10 3/10     Manuals 3/10 3/13 3/24     TPR to c/s psp, b/l UT/periscap RB  JH RB     C/s PROM   RB     C/s traction RB  RB     Consider p/a mobs to c/s                        Ther Ex   3/24     Pt Ed RB- HEP instruct/handout       C/s AROM- supine rotation  C/s AROM sitting x10      UT stretch  P!      T/s extension   10\"x10      T/s rotation   1-2\" x 10 ea b/l      Self-snag  10x5\" 2-3\" x 10 ea b/l                                              Neuro Re-Ed   3/24     Scapular retraction  5\" x10 10\"x10     Post sh rolls  20x 20x     Chin tucks  Seated x10x5\" Seated 10x 5\"      LPD/rows        TB resisted UT strengthening        TB resisted ER        TB resisted horizontal abduction        Prone scap retraction                                               Ther Act                                                                                                         Modalities             CP PRN              "

## 2025-03-26 ENCOUNTER — APPOINTMENT (OUTPATIENT)
Dept: PHYSICAL THERAPY | Facility: CLINIC | Age: 60
End: 2025-03-26
Payer: COMMERCIAL

## 2025-03-28 ENCOUNTER — APPOINTMENT (EMERGENCY)
Dept: RADIOLOGY | Facility: HOSPITAL | Age: 60
End: 2025-03-28
Payer: COMMERCIAL

## 2025-03-28 ENCOUNTER — HOSPITAL ENCOUNTER (EMERGENCY)
Facility: HOSPITAL | Age: 60
Discharge: HOME | End: 2025-03-28
Attending: EMERGENCY MEDICINE
Payer: COMMERCIAL

## 2025-03-28 VITALS
DIASTOLIC BLOOD PRESSURE: 85 MMHG | OXYGEN SATURATION: 95 % | RESPIRATION RATE: 17 BRPM | BODY MASS INDEX: 37.67 KG/M2 | WEIGHT: 240 LBS | TEMPERATURE: 97.3 F | HEART RATE: 89 BPM | SYSTOLIC BLOOD PRESSURE: 140 MMHG | HEIGHT: 67 IN

## 2025-03-28 DIAGNOSIS — S83.91XA RIGHT KNEE SPRAIN: Primary | ICD-10-CM

## 2025-03-28 PROCEDURE — 99283 EMERGENCY DEPT VISIT LOW MDM: CPT

## 2025-03-28 PROCEDURE — 73564 X-RAY EXAM KNEE 4 OR MORE: CPT

## 2025-03-28 PROCEDURE — 99284 EMERGENCY DEPT VISIT MOD MDM: CPT

## 2025-03-28 RX ORDER — IBUPROFEN 600 MG/1
600 TABLET, FILM COATED ORAL EVERY 6 HOURS PRN
Qty: 30 TABLET | Refills: 0 | Status: SHIPPED | OUTPATIENT
Start: 2025-03-28

## 2025-03-28 RX ORDER — SENNOSIDES 8.6 MG
650 CAPSULE ORAL EVERY 8 HOURS PRN
Qty: 30 TABLET | Refills: 0 | Status: SHIPPED | OUTPATIENT
Start: 2025-03-28

## 2025-03-28 NOTE — DISCHARGE INSTRUCTIONS
You were evaluated in the Emergency Department today for your knee pain. Your evaluation, including X-rays of your knee, did not show signs of fractures or other acute abnormalities which require further intervention at this time.  Your knee has been ace wrapped and you were given crutches in the ER to help your knee heal. Please rest, ice and elevate your knee, and resume normal activities as tolerated.  We recommend you take 600mg ibuprofen every 6 hours or tylenol 650mg every 6 hours as needed for pain. If needed, you can alternate these medications so that you take one medication every 3 hours. For instance, at noon take ibuprofen, then at 3pm take tylenol, then at 6pm take ibuprofen.   Please follow up with ortho within three days.  Return to the Emergency Department if you experience worsening or uncontrolled pain, numbness, tingling, or weakness to your legs, difficulty walking, worsening knee swelling or redness, or any other concerning symptoms.

## 2025-03-28 NOTE — ED PROVIDER NOTES
Time reflects when diagnosis was documented in both MDM as applicable and the Disposition within this note       Time User Action Codes Description Comment    3/28/2025  7:08 PM Sonam Barney Add [S83.91XA] Right knee sprain           ED Disposition       ED Disposition   Discharge    Condition   Stable    Date/Time   Fri Mar 28, 2025  7:08 PM    Comment   Alfonzo Kira discharge to home/self care.                   Assessment & Plan       Medical Decision Making  Vital signs stable.  Afebrile. Patient is resting comfortably on bed, not in any acute distress.  Pleasant and cooperative.    Ordered right knee x-ray which was is for acute osseous abnormality on my wet read.  Effusion present.  Patient placed in a hinged knee brace and given a cane.    Recommended rest, ice, compression, elevation, 600mg ibuprofen every 6 hours or tylenol 650mg every 6 hours as needed for pain. Prescribed Voltaren gel, ibuprofen, and Tylenol. Advised to f/u with Ortho. Case discussed with Dr. Feng. Advised patient to return with any new or worsening symptoms including but not limited to worsening or uncontrolled pain, numbness, tingling, or weakness to your legs, difficulty walking, worsening knee swelling or redness, or any other concerning symptoms. Discussed assessment and plan with patient who verbalizes understanding and agrees with plan.    Amount and/or Complexity of Data Reviewed  Radiology: ordered. Decision-making details documented in ED Course.    Risk  OTC drugs.  Prescription drug management.        ED Course as of 03/29/25 1351   Fri Mar 28, 2025   1819 Blood Pressure: 140/85   1819 Temperature(!): 97.3 °F (36.3 °C)   1819 Pulse: 89   1819 Respirations: 17   1819 SpO2: 95 %   1907 XR knee 4+ views Right injury  No acute osseus abnormality. Effusion present.       Medications - No data to display    ED Risk Strat Scores                            SBIRT 22yo+      Flowsheet Row Most Recent Value   Initial Alcohol  Screen: US AUDIT-C     1. How often do you have a drink containing alcohol? 0 Filed at: 03/28/2025 1720   2. How many drinks containing alcohol do you have on a typical day you are drinking?  0 Filed at: 03/28/2025 1829   3a. Male UNDER 65: How often do you have five or more drinks on one occasion? 0 Filed at: 03/28/2025 1829   3b. FEMALE Any Age, or MALE 65+: How often do you have 4 or more drinks on one occassion? 0 Filed at: 03/28/2025 1720   Audit-C Score 0 Filed at: 03/28/2025 1829   PHIL: How many times in the past year have you...    Used an illegal drug or used a prescription medication for non-medical reasons? Never Filed at: 03/28/2025 1829                            History of Present Illness       Chief Complaint   Patient presents with    Knee Pain     C/o right knee pain for few weeks, states it got worse today.        Past Medical History:   Diagnosis Date    Asthma       Past Surgical History:   Procedure Laterality Date    BICEPS TENDON REPAIR Left     SHOULDER SURGERY Right     TONSILLECTOMY        Family History   Problem Relation Age of Onset    Pancreatic cancer Father     Colon cancer Maternal Uncle       Social History     Tobacco Use    Smoking status: Unknown      E-Cigarette/Vaping      E-Cigarette/Vaping Substances      I have reviewed and agree with the history as documented.     Patient is a 59-year-old male who presents complaining of right knee pain onset 3 weeks ago.  Patient states that 3 weeks ago the pain started after standing up from a kneeling position.  Patient states that the pain was tolerable but then yesterday he was walking on uneven ground and heard a pop and had severe pain.  Patient reports associated swelling.  Patient denies any prior injury to the knee prior knee surgeries, fever, chills, history of Lyme's disease, recent hiking, numbness, weakness, or any other symptoms at this time.      History provided by:  Patient   used: No        Review of  Systems   Constitutional:  Negative for chills and fever.   HENT:  Negative for ear pain and sore throat.    Eyes:  Negative for pain and visual disturbance.   Respiratory:  Negative for cough and shortness of breath.    Cardiovascular:  Negative for chest pain and palpitations.   Gastrointestinal:  Negative for abdominal pain and vomiting.   Genitourinary:  Negative for dysuria and hematuria.   Musculoskeletal:  Positive for arthralgias and joint swelling. Negative for back pain.        Patient reports right knee pain and swelling.   Skin:  Negative for color change and rash.   Neurological:  Negative for seizures and syncope.   All other systems reviewed and are negative.          Objective       ED Triage Vitals [03/28/25 1720]   Temperature Pulse Blood Pressure Respirations SpO2 Patient Position - Orthostatic VS   (!) 97.3 °F (36.3 °C) 89 140/85 17 95 % Sitting      Temp Source Heart Rate Source BP Location FiO2 (%) Pain Score    Temporal Monitor Left arm -- 10 - Worst Possible Pain      Vitals      Date and Time Temp Pulse SpO2 Resp BP Pain Score FACES Pain Rating User   03/28/25 1720 97.3 °F (36.3 °C) 89 95 % 17 140/85 10 - Worst Possible Pain -- AC            Physical Exam  Vitals and nursing note reviewed.   Constitutional:       General: He is awake. He is not in acute distress.     Appearance: Normal appearance. He is well-developed and well-groomed. He is not ill-appearing, toxic-appearing or diaphoretic.      Comments: Patient is resting comfortably on bed, not in any acute distress.  Pleasant and cooperative.   HENT:      Head: Normocephalic and atraumatic.      Right Ear: External ear normal.      Left Ear: External ear normal.      Nose: Nose normal.      Mouth/Throat:      Lips: Pink.   Eyes:      General: Lids are normal.      Extraocular Movements: Extraocular movements intact.      Conjunctiva/sclera: Conjunctivae normal.   Cardiovascular:      Rate and Rhythm: Normal rate.      Pulses:            Posterior tibial pulses are 2+ on the right side and 2+ on the left side.      Heart sounds: Normal heart sounds. No murmur heard.  Pulmonary:      Effort: Pulmonary effort is normal. No tachypnea or respiratory distress.      Breath sounds: Normal breath sounds and air entry.   Musculoskeletal:         General: No swelling. Normal range of motion.      Cervical back: Normal range of motion.      Right knee: Swelling and effusion present. No deformity, erythema, ecchymosis, lacerations, bony tenderness or crepitus. Normal range of motion. Tenderness present over the medial joint line. No lateral joint line, MCL, LCL, ACL, PCL or patellar tendon tenderness. No LCL laxity, MCL laxity, ACL laxity or PCL laxity. Normal alignment.      Instability Tests: Anterior drawer test negative. Posterior drawer test negative. Anterior Lachman test negative.      Left knee: Normal.      Right lower leg: Normal. No tenderness. No edema.      Left lower leg: Normal. No tenderness. No edema.      Right ankle: Normal.      Left ankle: Normal.   Skin:     General: Skin is warm and dry.      Capillary Refill: Capillary refill takes less than 2 seconds.   Neurological:      Mental Status: He is alert and oriented to person, place, and time.      GCS: GCS eye subscore is 4. GCS verbal subscore is 5. GCS motor subscore is 6.   Psychiatric:         Attention and Perception: Attention normal.         Mood and Affect: Mood normal.         Speech: Speech normal.         Behavior: Behavior normal. Behavior is cooperative.         Results Reviewed       None            XR knee 4+ views Right injury   Final Interpretation by Rowan Granados MD (03/28 2113)      No acute osseous abnormality.      Moderate to large right suprapatellar joint effusion.            Workstation performed: [id number]             Procedures    ED Medication and Procedure Management   Prior to Admission Medications   Prescriptions Last Dose Informant Patient Reported?  Taking?   Cetirizine HCl (ZyrTEC Allergy) 10 MG CAPS   Yes No   Sig: Take by mouth   EPINEPHrine (EPIPEN) 0.3 mg/0.3 mL SOAJ   Yes No   Sig: Inject 0.3 mg into a muscle once   albuterol (2.5 mg/3 mL) 0.083 % nebulizer solution   Yes No   Sig: Take 2.5 mg by nebulization every 6 (six) hours as needed for wheezing or shortness of breath   albuterol (PROVENTIL HFA,VENTOLIN HFA) 90 mcg/act inhaler   Yes No   Sig: Inhale 2 puffs every 6 (six) hours as needed for wheezing   amoxicillin-clavulanate (AUGMENTIN) 875-125 mg per tablet   Yes No   Sig: Take 1 tablet by mouth every 12 (twelve) hours   benzonatate (TESSALON) 200 MG capsule   Yes No   Sig: Take 200 mg by mouth 3 (three) times a day as needed for cough   budesonide-formoterol (SYMBICORT) 160-4.5 mcg/act inhaler   Yes No   Sig: Inhale 2 puffs 2 (two) times a day Rinse mouth after use.   cyclobenzaprine (FLEXERIL) 10 mg tablet   Yes No   Sig: Take 10 mg by mouth 3 (three) times a day as needed for muscle spasms   ibuprofen (MOTRIN) 800 mg tablet   Yes No   Sig: Take by mouth every 6 (six) hours as needed for mild pain   meloxicam (MOBIC) 15 mg tablet   Yes No   Sig: Take 15 mg by mouth daily   montelukast (SINGULAIR) 10 mg tablet   Yes No   Sig: Take 10 mg by mouth daily at bedtime   pantoprazole (PROTONIX) 20 mg tablet   Yes No   Sig: Take 20 mg by mouth daily   predniSONE 10 mg tablet   Yes No   Sig: Take 10 mg by mouth daily   promethazine-dextromethorphan (PHENERGAN-DM) 6.25-15 mg/5 mL oral syrup   Yes No   Sig: Take by mouth 4 (four) times a day as needed for cough      Facility-Administered Medications: None     Discharge Medication List as of 3/28/2025  7:21 PM        START taking these medications    Details   acetaminophen (TYLENOL) 650 mg CR tablet Take 1 tablet (650 mg total) by mouth every 8 (eight) hours as needed for mild pain, Starting Fri 3/28/2025, Normal      Diclofenac Sodium (VOLTAREN) 1 % Apply 2 g topically 4 (four) times a day, Starting Fri  3/28/2025, Normal      !! ibuprofen (MOTRIN) 600 mg tablet Take 1 tablet (600 mg total) by mouth every 6 (six) hours as needed for mild pain, Starting Fri 3/28/2025, Normal       !! - Potential duplicate medications found. Please discuss with provider.        CONTINUE these medications which have NOT CHANGED    Details   albuterol (2.5 mg/3 mL) 0.083 % nebulizer solution Take 2.5 mg by nebulization every 6 (six) hours as needed for wheezing or shortness of breath, Historical Med      albuterol (PROVENTIL HFA,VENTOLIN HFA) 90 mcg/act inhaler Inhale 2 puffs every 6 (six) hours as needed for wheezing, Historical Med      amoxicillin-clavulanate (AUGMENTIN) 875-125 mg per tablet Take 1 tablet by mouth every 12 (twelve) hours, Historical Med      benzonatate (TESSALON) 200 MG capsule Take 200 mg by mouth 3 (three) times a day as needed for cough, Historical Med      budesonide-formoterol (SYMBICORT) 160-4.5 mcg/act inhaler Inhale 2 puffs 2 (two) times a day Rinse mouth after use., Historical Med      Cetirizine HCl (ZyrTEC Allergy) 10 MG CAPS Take by mouth, Historical Med      cyclobenzaprine (FLEXERIL) 10 mg tablet Take 10 mg by mouth 3 (three) times a day as needed for muscle spasms, Historical Med      EPINEPHrine (EPIPEN) 0.3 mg/0.3 mL SOAJ Inject 0.3 mg into a muscle once, Historical Med      !! ibuprofen (MOTRIN) 800 mg tablet Take by mouth every 6 (six) hours as needed for mild pain, Historical Med      meloxicam (MOBIC) 15 mg tablet Take 15 mg by mouth daily, Historical Med      montelukast (SINGULAIR) 10 mg tablet Take 10 mg by mouth daily at bedtime, Historical Med      pantoprazole (PROTONIX) 20 mg tablet Take 20 mg by mouth daily, Historical Med      predniSONE 10 mg tablet Take 10 mg by mouth daily, Historical Med      promethazine-dextromethorphan (PHENERGAN-DM) 6.25-15 mg/5 mL oral syrup Take by mouth 4 (four) times a day as needed for cough, Historical Med       !! - Potential duplicate medications found.  Please discuss with provider.          ED SEPSIS DOCUMENTATION   Time reflects when diagnosis was documented in both MDM as applicable and the Disposition within this note       Time User Action Codes Description Comment    3/28/2025  7:08 PM Sonam Barney Add [S83.91XA] Right knee sprain                  Sonam Barney PA-C  03/29/25 1923

## 2025-03-29 ENCOUNTER — TELEPHONE (OUTPATIENT)
Age: 60
End: 2025-03-29

## 2025-03-29 NOTE — TELEPHONE ENCOUNTER
Caller: Patient- Alfonzo    Doctor: Dr. Alvarez     Reason for call: Patient is calling in stating that he was advised by Dr Alvarez that he was able to be seen within the office on Monday 3/31 as I do not have any New Patient appointments available. He stated that he is not able to walk and is in a lot of pain to his right knee and would not be able to wait until his next available appointment on Friday 4/4. Please advise if this patient can be added onto the schedule.    Call back#: [phone]

## 2025-03-31 ENCOUNTER — TELEPHONE (OUTPATIENT)
Dept: OBGYN CLINIC | Facility: CLINIC | Age: 60
End: 2025-03-31

## 2025-03-31 ENCOUNTER — HOSPITAL ENCOUNTER (OUTPATIENT)
Dept: MRI IMAGING | Facility: HOSPITAL | Age: 60
Discharge: HOME | End: 2025-03-31
Payer: COMMERCIAL

## 2025-03-31 ENCOUNTER — APPOINTMENT (OUTPATIENT)
Dept: PHYSICAL THERAPY | Facility: CLINIC | Age: 60
End: 2025-03-31
Payer: COMMERCIAL

## 2025-03-31 ENCOUNTER — OFFICE VISIT (OUTPATIENT)
Dept: OBGYN CLINIC | Facility: CLINIC | Age: 60
End: 2025-03-31
Payer: COMMERCIAL

## 2025-03-31 VITALS — HEIGHT: 67 IN | WEIGHT: 246 LBS | BODY MASS INDEX: 38.61 KG/M2

## 2025-03-31 DIAGNOSIS — S83.91XA RIGHT KNEE SPRAIN: ICD-10-CM

## 2025-03-31 DIAGNOSIS — M23.91 INTERNAL DERANGEMENT OF RIGHT KNEE: ICD-10-CM

## 2025-03-31 DIAGNOSIS — M25.461 EFFUSION OF RIGHT KNEE: Primary | ICD-10-CM

## 2025-03-31 PROCEDURE — 73721 MRI JNT OF LWR EXTRE W/O DYE: CPT

## 2025-03-31 PROCEDURE — 99204 OFFICE O/P NEW MOD 45 MIN: CPT | Performed by: ORTHOPAEDIC SURGERY

## 2025-03-31 NOTE — TELEPHONE ENCOUNTER
Called ptANNIKA, to let him know that I scheduled him to see Dr. Alvarez on 4/2/25 at 9:00 in the [place] office. Left office number, [phone], if he has any questions.

## 2025-03-31 NOTE — PROGRESS NOTES
Name: Alfonzo Malone      : 1965      MRN: [id number]  Encounter Provider: Dejuan Alvarez DO  Encounter Date: 3/31/2025   Encounter department: [facility] [place]  :  Assessment & Plan  Right knee sprain    Orders:    Ambulatory Referral to Orthopedic Surgery    Effusion of right knee         Internal derangement of right knee    Orders:    MRI knee right  wo contrast; Future          Right knee effusion, internal derangement, concern for acute medial meniscus root tear.  X-rays reviewed in office today with patient  In light of acute injury, normal x-rays, patient's perceived instability, positive special testing and pain on exam right knee MRI ordered to evaluate for meniscus or other ligamentous injury  Continue hinge knee brace and cane as needed  Ibuprofen or Tylenol as needed for symptom management  Discussed therapeutic aspiration, patient declined.  I will see the patient back after MRI for further treatment planning    Chief Complaint     Right knee pain    History of the Present Illness   History of Present Illness   HPI   Alfonzo Malone is a 59 y.o. male with Right knee pain. Patient was seen in the ED on 3/28/2025 when x-rays were performed, he was provided a hinge knee brace, and a cane. Today, the patient reports knee pain since Friday. He reports pain /10 after he heard a pop while walking on uneven grounds. He had swelling after this event. Prior to this event, he had pain for three weeks on and off managed with ice. Increased pain with going up and down ladders and stairs. Denies locking, catching, mechanical symptoms. Pain with weight bearing. Pain located medially. Works in construction. Describes a sense of instability.     Review of Systems     Review of Systems   Constitutional:  Negative for chills and fever.   HENT:  Negative for ear pain and sore throat.    Eyes:  Negative for pain and visual disturbance.   Respiratory:  Negative for  "cough and shortness of breath.    Cardiovascular:  Negative for chest pain and palpitations.   Gastrointestinal:  Negative for abdominal pain and vomiting.   Genitourinary:  Negative for dysuria and hematuria.   Musculoskeletal:  Negative for arthralgias and back pain.   Skin:  Negative for color change and rash.   Neurological:  Negative for seizures and syncope.   All other systems reviewed and are negative.      Physical Exam   Objective   Ht 5' 7\" (1.702 m)   Wt 112 kg (246 lb)   BMI 38.53 kg/m²        Right Knee  Range of motion from 0 to 105 with pain.    There is no crepitus with range of motion.   There is small effusion.    There is mild tenderness over the posteromedial joint line, distal MCL.    There is 5/5 quadriceps strength and equal tone.    The patient is able to perform a straight leg raise.    negative patellar grind test.  Anterior drawer tests is negative  negative Lachman Test.   Posterior drawer test is   negative   Varus stress testing reveals no instability at 0 and 30 degrees   Valgus stress testing reveals no instability, mild pain at 0 and 30 degrees  Sergio's testing is positive   The patient is neurovascular intact distally.      Eyes:  Anicteric sclerae.  Neck:  Supple.  Lungs:  Normal respiratory effort.  Cardiovascular:  Capillary refill is less than 2 seconds.  Skin:  Intact without erythema.  Neurologic:  Sensation grossly intact to light touch.  Psychiatric:  Mood and affect are appropriate.    Data Review     I have personally reviewed pertinent films in PACS, and my interpretation follows:    X-rays taken 3/28/2025 of Right knee independently reviewed and demonstrate no acute fracture or dislocation. Joint spaces well maintained.  Large effusion present.    ER notes from 3/20/2025 reviewed.    Lab Results   Component Value Date/Time    HGBA1C 6 (H) 07/11/2018 04:00 PM       Past Medical History:   Diagnosis Date    Asthma        Past Surgical History:   Procedure Laterality " Date    BICEPS TENDON REPAIR Left     SHOULDER SURGERY Right     TONSILLECTOMY         Allergies   Allergen Reactions    Cat Dander Shortness Of Breath       Current Outpatient Medications on File Prior to Visit   Medication Sig Dispense Refill    acetaminophen (TYLENOL) 650 mg CR tablet Take 1 tablet (650 mg total) by mouth every 8 (eight) hours as needed for mild pain 30 tablet 0    albuterol (2.5 mg/3 mL) 0.083 % nebulizer solution Take 2.5 mg by nebulization every 6 (six) hours as needed for wheezing or shortness of breath      albuterol (PROVENTIL HFA,VENTOLIN HFA) 90 mcg/act inhaler Inhale 2 puffs every 6 (six) hours as needed for wheezing      amoxicillin-clavulanate (AUGMENTIN) 875-125 mg per tablet Take 1 tablet by mouth every 12 (twelve) hours      benzonatate (TESSALON) 200 MG capsule Take 200 mg by mouth 3 (three) times a day as needed for cough      budesonide-formoterol (SYMBICORT) 160-4.5 mcg/act inhaler Inhale 2 puffs 2 (two) times a day Rinse mouth after use.      Cetirizine HCl (ZyrTEC Allergy) 10 MG CAPS Take by mouth      cyclobenzaprine (FLEXERIL) 10 mg tablet Take 10 mg by mouth 3 (three) times a day as needed for muscle spasms      Diclofenac Sodium (VOLTAREN) 1 % Apply 2 g topically 4 (four) times a day 50 g 0    EPINEPHrine (EPIPEN) 0.3 mg/0.3 mL SOAJ Inject 0.3 mg into a muscle once      ibuprofen (MOTRIN) 600 mg tablet Take 1 tablet (600 mg total) by mouth every 6 (six) hours as needed for mild pain 30 tablet 0    ibuprofen (MOTRIN) 800 mg tablet Take by mouth every 6 (six) hours as needed for mild pain      meloxicam (MOBIC) 15 mg tablet Take 15 mg by mouth daily      montelukast (SINGULAIR) 10 mg tablet Take 10 mg by mouth daily at bedtime      pantoprazole (PROTONIX) 20 mg tablet Take 20 mg by mouth daily      predniSONE 10 mg tablet Take 10 mg by mouth daily      promethazine-dextromethorphan (PHENERGAN-DM) 6.25-15 mg/5 mL oral syrup Take by mouth 4 (four) times a day as needed for  cough       No current facility-administered medications on file prior to visit.       Social History     Tobacco Use    Smoking status: Unknown   Vaping Use    Vaping status: Never Used   Substance Use Topics    Drug use: Never       Family History   Problem Relation Age of Onset    Pancreatic cancer Father     Colon cancer Maternal Uncle              Procedures Performed     Procedures  None      Cheri Stevens    Scribe Attestation      I,:  Cheri Stevens am acting as a scribe while in the presence of the attending physician.:       I,:  Dejuan Alvarez, DO personally performed the services described in this documentation    as scribed in my presence.:

## 2025-04-02 ENCOUNTER — OFFICE VISIT (OUTPATIENT)
Dept: OBGYN CLINIC | Facility: CLINIC | Age: 60
End: 2025-04-02
Payer: COMMERCIAL

## 2025-04-02 ENCOUNTER — APPOINTMENT (OUTPATIENT)
Dept: RADIOLOGY | Facility: CLINIC | Age: 60
End: 2025-04-02
Payer: COMMERCIAL

## 2025-04-02 VITALS — BODY MASS INDEX: 38.61 KG/M2 | HEIGHT: 67 IN | WEIGHT: 246 LBS

## 2025-04-02 DIAGNOSIS — M25.561 RIGHT KNEE PAIN, UNSPECIFIED CHRONICITY: ICD-10-CM

## 2025-04-02 DIAGNOSIS — S83.231D COMPLEX TEAR OF MEDIAL MENISCUS OF RIGHT KNEE AS CURRENT INJURY, SUBSEQUENT ENCOUNTER: ICD-10-CM

## 2025-04-02 DIAGNOSIS — M25.461 EFFUSION OF RIGHT KNEE: Primary | ICD-10-CM

## 2025-04-02 DIAGNOSIS — M25.561 ACUTE PAIN OF RIGHT KNEE: ICD-10-CM

## 2025-04-02 DIAGNOSIS — M23.91 INTERNAL DERANGEMENT OF RIGHT KNEE: ICD-10-CM

## 2025-04-02 PROCEDURE — 99213 OFFICE O/P EST LOW 20 MIN: CPT | Performed by: ORTHOPAEDIC SURGERY

## 2025-04-02 PROCEDURE — 73564 X-RAY EXAM KNEE 4 OR MORE: CPT

## 2025-04-02 PROCEDURE — 20610 DRAIN/INJ JOINT/BURSA W/O US: CPT | Performed by: ORTHOPAEDIC SURGERY

## 2025-04-02 RX ORDER — BUPIVACAINE HYDROCHLORIDE 2.5 MG/ML
2 INJECTION, SOLUTION INFILTRATION; PERINEURAL
Status: COMPLETED | OUTPATIENT
Start: 2025-04-02 | End: 2025-04-02

## 2025-04-02 RX ORDER — KETOROLAC TROMETHAMINE 30 MG/ML
30 INJECTION, SOLUTION INTRAMUSCULAR; INTRAVENOUS
Status: COMPLETED | OUTPATIENT
Start: 2025-04-02 | End: 2025-04-02

## 2025-04-02 RX ORDER — LIDOCAINE HYDROCHLORIDE 10 MG/ML
2 INJECTION, SOLUTION INFILTRATION; PERINEURAL
Status: COMPLETED | OUTPATIENT
Start: 2025-04-02 | End: 2025-04-02

## 2025-04-02 RX ADMIN — BUPIVACAINE HYDROCHLORIDE 2 ML: 2.5 INJECTION, SOLUTION INFILTRATION; PERINEURAL at 09:00

## 2025-04-02 RX ADMIN — LIDOCAINE HYDROCHLORIDE 2 ML: 10 INJECTION, SOLUTION INFILTRATION; PERINEURAL at 09:00

## 2025-04-02 RX ADMIN — KETOROLAC TROMETHAMINE 30 MG: 30 INJECTION, SOLUTION INTRAMUSCULAR; INTRAVENOUS at 09:00

## 2025-04-02 NOTE — LETTER
April 11, 2025     Patient: Alfonzo Malone  YOB: 1965  Date of Visit: 4/2/2025      To Whom it May Concern:    Alfonzo Malone is under my professional care. Alfonzo was seen in my office on 4/2/2025. He had a recent right knee injury. Please allow accommodations to avoid long standing in line and allow for leg room for mobility of knee.      If you have any questions or concerns, please don't hesitate to call.         Sincerely,          Dejuan Alvarez,         CC: No Recipients

## 2025-04-02 NOTE — PROGRESS NOTES
Name: Alfonzo Malone      : 1965      MRN: [id number]  Encounter Provider: Dejuan Alvarez DO  Encounter Date: 2025   Encounter department: [facility] [place]  :  Assessment & Plan  Effusion of right knee    Orders:    Ambulatory Referral to Physical Therapy; Future    Large joint arthrocentesis: R knee    Internal derangement of right knee         Acute pain of right knee    Orders:    XR knee 4+ vw right injury; Future    Ambulatory Referral to Physical Therapy; Future    Complex tear of medial meniscus of right knee as current injury, subsequent encounter    Orders:    Ambulatory Referral to Physical Therapy; Future    Large joint arthrocentesis: R knee      Right knee medial meniscus tear, bony edema medial tibia, grade 1 MCL sprain, Right knee effusion   MRI reviewed with patient  Educated patient about nonoperative vs surgical management of Right knee meniscus tear and bony edema  Nonoperative management by means of OTC topical and oral analgesics, continued use of brace for MCL sprain, aspiration and CSI for pain relief, outpatient PT for gait training, ROM and strengthening.   Educated patient the bony edema of medial tibial plateau will take time to heal  Educated patient about weight loss to offload joints for improvement of symptoms and overall health   Surgical management by means of knee arthroscopy partial medial menisectomy vs possible repair. Educated patient about the complex nature of medial meniscus tear and likely proceeding with partial menisectomy. Advised patient he may still have post operative pain secondary to medial tibial bony edema.   At this time, recommended Right knee aspiration and CSI vs Toradol injection in the office to decrease pain and inflammation, improving range of motion and function. Patient agreeable to the above, risks and benefits reviewed. Aspirated 10cc of clear, synovial fluid without complications, provided  "Toradol injection through same 18g needle. Tolerate procedure well. Can provide repeat injection 3 months from most recent.   Continue to use the brace with increased function and while working  Follow up 3 months. If patient's symptoms persistent or worsening, or if patient considering surgical management, advised to call office sooner for further discussion.       History of the Present Illness   History of Present Illness   HPI   Alfonzo Malone is a 59 y.o. male with Right knee internal derangement. Today, patient reports persistent pain with weightbearing and driving. He admits to decreased swelling of the knee since last appointment. He has purchased a brace to use during work and has been ambulating with single point cane, requesting crutches. Denies locking of the knee.         Review of Systems     Review of Systems   Constitutional:  Negative for chills and fever.   HENT:  Negative for ear pain and sore throat.    Eyes:  Negative for pain and visual disturbance.   Respiratory:  Negative for cough and shortness of breath.    Cardiovascular:  Negative for chest pain and palpitations.   Gastrointestinal:  Negative for abdominal pain and vomiting.   Genitourinary:  Negative for dysuria and hematuria.   Musculoskeletal:  Negative for arthralgias and back pain.   Skin:  Negative for color change and rash.   Neurological:  Negative for seizures and syncope.   All other systems reviewed and are negative.      Physical Exam   Objective   Ht 5' 7\" (1.702 m)   Wt 112 kg (246 lb)   BMI 38.53 kg/m²        Right Knee  Range of motion from 0 to 120 degrees.    There is small-moderate effusion.    There is tenderness over the medial joint line, proximal medial tibia, along course of MCL and pes anserinus bursa.    The patient is able to perform a straight leg raise.    Varus stress testing reveals no pain or instability at 0 and 30 degrees   Valgus stress testing reveals pain without instability at 0 and 30 " degrees  The patient is neurovascular intact distally.      Data Review     I have personally reviewed pertinent films in PACS, and my interpretation follows.    X-rays taken 04/02/2025 of Right knee independently reviewed and demonstrate well maintained joint spaces without acute fracture or dislocation.    MRI performed  03/31/2025 of Right knee demonstrates longitudinal tear of anterior horn of medial mensicus with extension into body and posterior horn. Posterior meniscal root is intact.  Grade 2 chondrosis medial patellar facet. Subchondral edema medial tibial plateau. Moderate joint effusion noted. Grade 1 MCL sprain.     Lab Results   Component Value Date/Time    HGBA1C 6 (H) 07/11/2018 04:00 PM       Social History     Tobacco Use    Smoking status: Unknown   Vaping Use    Vaping status: Never Used   Substance Use Topics    Alcohol use: Not Currently    Drug use: Never           Large joint arthrocentesis: R knee  Universal Protocol:  Risks and benefits: risks, benefits and alternatives were discussed  Consent given by: patient  Patient identity confirmed: verbally with patient  Supporting Documentation  Indications: pain   Procedure Details  Location: knee - R knee  Needle size: 22 G  Approach: lateral  Medications administered: 2 mL bupivacaine 0.25 %; 2 mL lidocaine 1 %; 30 mg ketorolac 30 mg/mL    Aspirate amount: 10 mL  Aspirate: clear and yellow  Patient tolerance: patient tolerated the procedure well with no immediate complications  Dressing:  Sterile dressing applied            Dejuan Alvarez DO

## 2025-05-16 ENCOUNTER — OFFICE VISIT (OUTPATIENT)
Dept: OBGYN CLINIC | Facility: CLINIC | Age: 60
End: 2025-05-16
Payer: COMMERCIAL

## 2025-05-16 VITALS — BODY MASS INDEX: 39.55 KG/M2 | HEIGHT: 67 IN | WEIGHT: 252 LBS

## 2025-05-16 DIAGNOSIS — S83.231D COMPLEX TEAR OF MEDIAL MENISCUS OF RIGHT KNEE AS CURRENT INJURY, SUBSEQUENT ENCOUNTER: Primary | ICD-10-CM

## 2025-05-16 PROCEDURE — 99213 OFFICE O/P EST LOW 20 MIN: CPT | Performed by: ORTHOPAEDIC SURGERY

## 2025-05-16 NOTE — PROGRESS NOTES
Name: Alfonzo Malone      : 1965      MRN: [id number]  Encounter Provider: Dejuan Alvarez DO  Encounter Date: 2025   Encounter department: [facility] [place]  :  Assessment & Plan  Complex tear of medial meniscus of right knee as current injury, subsequent encounter    Orders:    Medial  Knee Brace          Right knee  medial meniscus tear, bony edema medial tibia, grade 1 MCL sprain   Patient continues to improve s/p aspiration or Toradol injection  Can consider PRP, corticosteroid, or visco supplementation if symptoms worsen or fail to improve  Medial  brace ordered for the right knee. He will also try knee savers wedge while he is working  Avoid high impact activity, recommend low impact activity like biking or elliptical  Would refer to Dr. Robles for PRP injection if patient decides to try this in the future  Also discussed weight loss to offload pressure on his knees and to help with knee pain  Follow up as needed for repeat toradol vs CSI vs visco injection      History of the Present Illness     History of Present Illness   HPI   Alfonzo Malone is a 59 y.o. male with Right knee medial meniscus tear, bony edema medial tibia, grade 1 MCL sprain. He was last seen in the office on 2025 when he was provided an aspiration and Toradol injection. Patient is doing well overall. He admits immediate relief following the injection. He did go on a cruise afterwards and did a lot of walking and admits increased pain with this. Now, he is not using a cane or brace and feels he is doing well overall. He has increased pain at the end of the day. He feels 75% improvement in symptoms.       Review of Systems     Review of Systems   Constitutional:  Negative for chills and fever.   HENT:  Negative for ear pain and sore throat.    Eyes:  Negative for pain and visual disturbance.   Respiratory:  Negative for cough and shortness of breath.     Cardiovascular:  Negative for chest pain and palpitations.   Gastrointestinal:  Negative for abdominal pain and vomiting.   Genitourinary:  Negative for dysuria and hematuria.   Musculoskeletal:  Negative for arthralgias and back pain.   Skin:  Negative for color change and rash.   Neurological:  Negative for seizures and syncope.   All other systems reviewed and are negative.      Physical Exam     Objective   There were no vitals taken for this visit.       Right Knee  Range of motion from 0 to 125.    There is no effusion.    There is no tenderness over the medial joint line.    The patient is able to perform a straight leg raise.    Varus stress testing reveals no instability at 0 and 30 degrees   Valgus stress testing reveals no instability at 0 and 30 degrees  The patient is neurovascular intact distally.      Data Review     I have personally reviewed pertinent films in PACS, and my interpretation follows.    X-rays taken 04/02/2025 of Right knee independently reviewed and demonstrate well maintained joint spaces without acute fracture or dislocation.     MRI performed  03/31/2025 of Right knee demonstrates longitudinal tear of anterior horn of medial mensicus with extension into body and posterior horn. Posterior meniscal root is intact.  Grade 2 chondrosis medial patellar facet. Subchondral edema medial tibial plateau. Moderate joint effusion noted. Grade 1 MCL sprain.     Lab Results   Component Value Date/Time    HGBA1C 6 (H) 07/11/2018 04:00 PM       Social History[1]        Procedures  None.    Cheri Stevens   Scribe Attestation      I,:  Cheri Stevens am acting as a scribe while in the presence of the attending physician.:       I,:  Dejuan Alvarez, DO personally performed the services described in this documentation    as scribed in my presence.:                  [1]   Social History  Tobacco Use    Smoking status: Unknown   Vaping Use    Vaping status: Never Used   Substance Use Topics     Alcohol use: Not Currently    Drug use: Never